# Patient Record
Sex: MALE | Race: WHITE | NOT HISPANIC OR LATINO | Employment: FULL TIME | URBAN - METROPOLITAN AREA
[De-identification: names, ages, dates, MRNs, and addresses within clinical notes are randomized per-mention and may not be internally consistent; named-entity substitution may affect disease eponyms.]

---

## 2017-12-23 ENCOUNTER — OFFICE VISIT (OUTPATIENT)
Dept: URGENT CARE | Facility: PHYSICIAN GROUP | Age: 37
End: 2017-12-23
Payer: COMMERCIAL

## 2017-12-23 VITALS
TEMPERATURE: 97.6 F | DIASTOLIC BLOOD PRESSURE: 64 MMHG | SYSTOLIC BLOOD PRESSURE: 106 MMHG | HEART RATE: 74 BPM | OXYGEN SATURATION: 98 % | WEIGHT: 205 LBS | HEIGHT: 73 IN | BODY MASS INDEX: 27.17 KG/M2

## 2017-12-23 DIAGNOSIS — M79.671 RIGHT FOOT PAIN: ICD-10-CM

## 2017-12-23 PROCEDURE — 99204 OFFICE O/P NEW MOD 45 MIN: CPT | Performed by: PHYSICIAN ASSISTANT

## 2017-12-23 RX ORDER — TRAMADOL HYDROCHLORIDE 50 MG/1
50 TABLET ORAL EVERY 6 HOURS PRN
Qty: 20 TAB | Refills: 0 | Status: SHIPPED | OUTPATIENT
Start: 2017-12-23 | End: 2017-12-30

## 2017-12-23 RX ORDER — INDOMETHACIN 50 MG/1
50 CAPSULE ORAL 3 TIMES DAILY
Qty: 60 CAP | Refills: 0 | Status: SHIPPED | OUTPATIENT
Start: 2017-12-23

## 2017-12-24 NOTE — PATIENT INSTRUCTIONS
Gout  Gout is an inflammatory arthritis caused by a buildup of uric acid crystals in the joints. Uric acid is a chemical that is normally present in the blood. When the level of uric acid in the blood is too high it can form crystals that deposit in your joints and tissues. This causes joint redness, soreness, and swelling (inflammation). Repeat attacks are common. Over time, uric acid crystals can form into masses (tophi) near a joint, destroying bone and causing disfigurement. Gout is treatable and often preventable.  CAUSES   The disease begins with elevated levels of uric acid in the blood. Uric acid is produced by your body when it breaks down a naturally found substance called purines. Certain foods you eat, such as meats and fish, contain high amounts of purines. Causes of an elevated uric acid level include:  · Being passed down from parent to child (heredity).  · Diseases that cause increased uric acid production (such as obesity, psoriasis, and certain cancers).  · Excessive alcohol use.  · Diet, especially diets rich in meat and seafood.  · Medicines, including certain cancer-fighting medicines (chemotherapy), water pills (diuretics), and aspirin.  · Chronic kidney disease. The kidneys are no longer able to remove uric acid well.  · Problems with metabolism.  Conditions strongly associated with gout include:  · Obesity.  · High blood pressure.  · High cholesterol.  · Diabetes.  Not everyone with elevated uric acid levels gets gout. It is not understood why some people get gout and others do not. Surgery, joint injury, and eating too much of certain foods are some of the factors that can lead to gout attacks.  SYMPTOMS   · An attack of gout comes on quickly. It causes intense pain with redness, swelling, and warmth in a joint.  · Fever can occur.  · Often, only one joint is involved. Certain joints are more commonly involved:  ¨ Base of the big toe.  ¨ Knee.  ¨ Ankle.  ¨ Wrist.  ¨ Finger.  Without  treatment, an attack usually goes away in a few days to weeks. Between attacks, you usually will not have symptoms, which is different from many other forms of arthritis.  DIAGNOSIS   Your caregiver will suspect gout based on your symptoms and exam. In some cases, tests may be recommended. The tests may include:  · Blood tests.  · Urine tests.  · X-rays.  · Joint fluid exam. This exam requires a needle to remove fluid from the joint (arthrocentesis). Using a microscope, gout is confirmed when uric acid crystals are seen in the joint fluid.  TREATMENT   There are two phases to gout treatment: treating the sudden onset (acute) attack and preventing attacks (prophylaxis).  · Treatment of an Acute Attack.  ¨ Medicines are used. These include anti-inflammatory medicines or steroid medicines.  ¨ An injection of steroid medicine into the affected joint is sometimes necessary.  ¨ The painful joint is rested. Movement can worsen the arthritis.  ¨ You may use warm or cold treatments on painful joints, depending which works best for you.  · Treatment to Prevent Attacks.  ¨ If you suffer from frequent gout attacks, your caregiver may advise preventive medicine. These medicines are started after the acute attack subsides. These medicines either help your kidneys eliminate uric acid from your body or decrease your uric acid production. You may need to stay on these medicines for a very long time.  ¨ The early phase of treatment with preventive medicine can be associated with an increase in acute gout attacks. For this reason, during the first few months of treatment, your caregiver may also advise you to take medicines usually used for acute gout treatment. Be sure you understand your caregiver's directions. Your caregiver may make several adjustments to your medicine dose before these medicines are effective.  ¨ Discuss dietary treatment with your caregiver or dietitian. Alcohol and drinks high in sugar and fructose and foods  such as meat, poultry, and seafood can increase uric acid levels. Your caregiver or dietitian can advise you on drinks and foods that should be limited.  HOME CARE INSTRUCTIONS   · Do not take aspirin to relieve pain. This raises uric acid levels.  · Only take over-the-counter or prescription medicines for pain, discomfort, or fever as directed by your caregiver.  · Rest the joint as much as possible. When in bed, keep sheets and blankets off painful areas.  · Keep the affected joint raised (elevated).  · Apply warm or cold treatments to painful joints. Use of warm or cold treatments depends on which works best for you.  · Use crutches if the painful joint is in your leg.  · Drink enough fluids to keep your urine clear or pale yellow. This helps your body get rid of uric acid. Limit alcohol, sugary drinks, and fructose drinks.  · Follow your dietary instructions. Pay careful attention to the amount of protein you eat. Your daily diet should emphasize fruits, vegetables, whole grains, and fat-free or low-fat milk products. Discuss the use of coffee, vitamin C, and cherries with your caregiver or dietitian. These may be helpful in lowering uric acid levels.  · Maintain a healthy body weight.  SEEK MEDICAL CARE IF:   · You develop diarrhea, vomiting, or any side effects from medicines.  · You do not feel better in 24 hours, or you are getting worse.  SEEK IMMEDIATE MEDICAL CARE IF:   · Your joint becomes suddenly more tender, and you have chills or a fever.  MAKE SURE YOU:   · Understand these instructions.  · Will watch your condition.  · Will get help right away if you are not doing well or get worse.     This information is not intended to replace advice given to you by your health care provider. Make sure you discuss any questions you have with your health care provider.     Document Released: 12/15/2001 Document Revised: 01/08/2016 Document Reviewed: 07/31/2013  [employer] Patient Education ©2016  [employer]

## 2017-12-29 ASSESSMENT — ENCOUNTER SYMPTOMS
FATIGUE: 0
SHORTNESS OF BREATH: 0
FEVER: 0
DIARRHEA: 0
DIZZINESS: 0
NAUSEA: 0
ABDOMINAL PAIN: 0
JOINT SWELLING: 1
WEAKNESS: 0
VOMITING: 0
SORE THROAT: 0
CHILLS: 0

## 2017-12-29 NOTE — PROGRESS NOTES
"Subjective:      Reynaldo Rocha is a 37 y.o. male who presents with Foot Problem (x2days R foot pain poss gout)            Foot Problem   This is a new problem. The current episode started in the past 7 days (3 days). The problem occurs constantly. The problem has been gradually worsening. Associated symptoms include joint swelling. Pertinent negatives include no abdominal pain, chest pain, chills, congestion, fatigue, fever, nausea, rash, sore throat, vomiting or weakness. The symptoms are aggravated by walking. He has tried NSAIDs for the symptoms. The treatment provided mild relief.     Patient presents to urgent care reporting a 3 day history of worsening right foot pain and swelling. He denies any known injuries or trauma to the foot. He states the pain started in his great toe, but has moved to the foot joint. No personal or family history of gout. No known kidney disease. He states he had multiple steak dinners in the week before his symptoms started. No fevers, chills, body aches, or distal numbness/tingling.     Review of Systems   Constitutional: Negative for chills, fatigue and fever.   HENT: Negative for congestion, ear pain and sore throat.    Respiratory: Negative for shortness of breath.    Cardiovascular: Negative for chest pain.   Gastrointestinal: Negative for abdominal pain, diarrhea, nausea and vomiting.   Genitourinary: Negative.    Musculoskeletal: Positive for joint swelling.        Positive for foot pain   Skin: Negative for rash.   Neurological: Negative for dizziness and weakness.   All other systems reviewed and are negative.       Objective:     /64   Pulse 74   Temp 36.4 °C (97.6 °F)   Ht 1.854 m (6' 1\")   Wt 93 kg (205 lb)   SpO2 98%   BMI 27.05 kg/m²      Physical Exam   Constitutional: He is oriented to person, place, and time. He appears well-developed and well-nourished. No distress.   HENT:   Head: Normocephalic and atraumatic.   Eyes: Pupils are equal, round, and " reactive to light.   Neck: Normal range of motion.   Cardiovascular: Normal rate.    Pulmonary/Chest: Effort normal.   Musculoskeletal:        Right foot: There is decreased range of motion, tenderness, bony tenderness and swelling.        Feet:    Antalgic gait favoring right lower extremity. Right foot erythematous with diffuse edema. +hotness to touch.    Neurological: He is alert and oriented to person, place, and time.   Skin: Skin is warm and dry. He is not diaphoretic.   Psychiatric: He has a normal mood and affect. His behavior is normal.   Nursing note and vitals reviewed.         PMH:  has no past medical history on file.  MEDS:   Current Outpatient Prescriptions:   •  indomethacin (INDOCIN) 50 MG Cap, Take 1 Cap by mouth 3 times a day., Disp: 60 Cap, Rfl: 0  •  tramadol (ULTRAM) 50 MG Tab, Take 1 Tab by mouth every 6 hours as needed for up to 7 days., Disp: 20 Tab, Rfl: 0  •  ibuprofen (MOTRIN) 600 MG TABS, Take 600 mg by mouth every 6 hours as needed., Disp: , Rfl:   ALLERGIES: No Known Allergies  SURGHX:   Past Surgical History:   Procedure Laterality Date   • OTHER ORTHOPEDIC SURGERY       SOCHX:  reports that he has never smoked. He has never used smokeless tobacco. He reports that he drinks alcohol. He reports that he uses drugs, including Inhaled.  FH: family history is not on file.       Assessment/Plan:     1. Right foot pain  - URIC ACID, SERUM  - indomethacin (INDOCIN) 50 MG Cap; Take 1 Cap by mouth 3 times a day.  Dispense: 60 Cap; Refill: 0  - tramadol (ULTRAM) 50 MG Tab; Take 1 Tab by mouth every 6 hours as needed for up to 7 days.  Dispense: 20 Tab; Refill: 0   - Will cause sedation, avoid driving, operating heavy machinery, and drinking alcohol    Possible gout, will treat empirically at today's visit. Uric acid ordered at today's visit. Encouraged to monitory symptoms closely and RTC for any new/worsening symptoms. Foods high in uric acid discussed at today's visit, avoid while dealing  with acute flare. Increased fluids. The patient demonstrated a good understanding and agreed with the treatment plan.